# Patient Record
Sex: MALE | Race: BLACK OR AFRICAN AMERICAN
[De-identification: names, ages, dates, MRNs, and addresses within clinical notes are randomized per-mention and may not be internally consistent; named-entity substitution may affect disease eponyms.]

---

## 2020-10-27 ENCOUNTER — RESULT REVIEW (OUTPATIENT)
Age: 50
End: 2020-10-27

## 2022-04-11 PROBLEM — Z00.00 ENCOUNTER FOR PREVENTIVE HEALTH EXAMINATION: Status: ACTIVE | Noted: 2022-04-11

## 2022-05-18 ENCOUNTER — APPOINTMENT (OUTPATIENT)
Dept: ORTHOPEDIC SURGERY | Facility: CLINIC | Age: 52
End: 2022-05-18
Payer: OTHER MISCELLANEOUS

## 2022-05-18 VITALS — WEIGHT: 253 LBS | HEIGHT: 73 IN | BODY MASS INDEX: 33.53 KG/M2

## 2022-05-18 DIAGNOSIS — Z78.9 OTHER SPECIFIED HEALTH STATUS: ICD-10-CM

## 2022-05-18 DIAGNOSIS — I10 ESSENTIAL (PRIMARY) HYPERTENSION: ICD-10-CM

## 2022-05-18 DIAGNOSIS — E11.9 TYPE 2 DIABETES MELLITUS W/OUT COMPLICATIONS: ICD-10-CM

## 2022-05-18 PROCEDURE — 99072 ADDL SUPL MATRL&STAF TM PHE: CPT

## 2022-05-18 PROCEDURE — 73030 X-RAY EXAM OF SHOULDER: CPT | Mod: RT

## 2022-05-18 PROCEDURE — 72040 X-RAY EXAM NECK SPINE 2-3 VW: CPT

## 2022-05-18 PROCEDURE — 99204 OFFICE O/P NEW MOD 45 MIN: CPT

## 2022-05-18 NOTE — HISTORY OF PRESENT ILLNESS
[de-identified] :  53yo male presenting to the office c/o right shoulder pain from a work related injury that occurred on 5/9/22. 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  He reports injury occurred while lifting 5 gallon pale of spackle. Patient states he noticed slight lateral shoulder discomfort. Has noticed intermittent lateral radiating pain and difficulty into right 3-4th digits. He has inability to extend fingers.  Denies any numbness or tingling.

## 2022-05-18 NOTE — PHYSICAL EXAM
[de-identified] : Constitutional: The general appearance of the patient is well developed, well nourished, no deformities and well groomed. Normal \par \par Gait: Gait and function is as follows: normal gait. \par \par Skin: Head and neck visualized skin is normal. Left upper extremity visualized skin is normal. Right upper extremity visualized skin is normal. Thoracic Skin of the thoracic spine shows visualized skin is normal. \par \par Cardiovascular: palpable radial pulse bilaterally, good capillary refill in digits of the bilateral upper extremities and no temperature or color changes in the bilateral upper extremities. \par \par Lymphatic: Normal Palpation of lymph nodes in the cervical. \par \par Neurologic: fine motor control in the bilateral upper extremities is intact. Deep Tendon Reflexes in Upper and Lower Extremities Negative Doyle's in the bilateral upper extremities. The patient is oriented to time, place and person. Sensation to light touch intact in the bilateral upper extremities. Mood and Affect is normal. \par \par Right Shoulder: Inspection of the shoulder/upper arm is as follows: no scapula winging, no biceps deformity and no AC joint deformity. Inspection of the wound reveals healed incision. Palpation of the shoulder/upper arm is as follows: There is tenderness at the AC joint and proximal biceps tendon. Range of motion of the shoulder is as follows: Limited range of motion compatible with recent surgery. Strength of the shoulder is as follows: Supraspinatus 4/5. External Rotation 4/5. Internal Rotation 4/5. Infraspinatus 5/5 4/5. Deltoid 5/5 Ligament Stability and Special Tests of the shoulder is as follows: stable shoulder. \par \par \par \par Left Shoulder: Inspection of the shoulder/upper arm is as follows: There is a full, pain-free, stable range of motion of the shoulder with normal strength and no tenderness to palpation. \par \par Neck: \par Inspection / Palpation of the cervical spine is as follows: no paracervical tenderness. Range of motion of the cervical spine is as follows: moderately decreased range of motion of the cervical spine. Stability testing for the cervical spine is as follows Stable range of motion. \par \par \par Back, including spine: Inspection / Palpation of the thoracic/lumbar spine is as follows: There is a full, pain free, stable range of motion of the thoracic spine with a normal tone and not tenderness to palpation..\par

## 2022-05-18 NOTE — DISCUSSION/SUMMARY
[de-identified] : RUE: 4/5 triceps, 3+/5 WE, 3/5 Interosseus\par \par 51yo male presenting to the office c/o right shoulder pain from a work related injury that occurred on 5/9/22.\par x-rays today of c-spine demonstrate disk space narrowing at C5-6, C6-7\par x-rays today of right shoulder demonstrate no loss of surgical correction\par ROM of shoulder is preserved \par Recommended MRI of c-spine to further evaluate for nerve impingements \par Patient was prescribed an MDP as an anti-inflammatory \par He will consult with his PCP if he is able to take MDP due to diabetes \par Patient was prescribed a muscle relaxer to be taken before bed \par Follow up 1-2 weeks to review MRI \par consider follow up Dr. Merchant or Dr. Brown \par \par \par \par (1) We discussed a comprehensive treatment plans that included a prescription management plan involving the use of prescription strength medications to include Ibuprofen 600-800 mg TID, versus 500-650 mg Tylenol. We also discussed prescribing topical diclofenac (Voltaren gel) as well as once daily Meloxicam 15 mg.\par (2) The patient has More Than One chronic injuries/illnesses as outlined, discussed, and documented by ICD 10 codes listed, as well as the HPI and Plan section.\par There is a moderate risk of morbidity with further treatment, especially from use of prescription strength medications and possible side effects of these medications which include upset stomach and cardiac/renal issues with long term use were discussed.\par (3) I recommended that the patient follow-up with their medical physician to discuss any significant specific potential issues with long term use such as interactions with current medications or with exacerbation of underlying medical morbidities. \par \par Attestation:\par I, Elyssa Gilbert , attest that this documentation has been prepared under the direction and in the presence of Provider Arian Edgar MD.\par The documentation recorded by the scribe, in my presence, accurately reflects the service I personally performed, and the decisions made by me with my edits as appropriate.\par Arian Edgar MD\par \par

## 2022-05-20 ENCOUNTER — FORM ENCOUNTER (OUTPATIENT)
Age: 52
End: 2022-05-20

## 2022-05-21 ENCOUNTER — APPOINTMENT (OUTPATIENT)
Dept: MRI IMAGING | Facility: CLINIC | Age: 52
End: 2022-05-21
Payer: OTHER MISCELLANEOUS

## 2022-05-21 PROCEDURE — 72141 MRI NECK SPINE W/O DYE: CPT

## 2022-05-21 PROCEDURE — 99072 ADDL SUPL MATRL&STAF TM PHE: CPT

## 2022-05-25 ENCOUNTER — APPOINTMENT (OUTPATIENT)
Dept: ORTHOPEDIC SURGERY | Facility: CLINIC | Age: 52
End: 2022-05-25
Payer: OTHER MISCELLANEOUS

## 2022-05-25 VITALS — WEIGHT: 253 LBS | BODY MASS INDEX: 33.53 KG/M2 | HEIGHT: 73 IN

## 2022-05-25 DIAGNOSIS — M25.511 PAIN IN RIGHT SHOULDER: ICD-10-CM

## 2022-05-25 PROCEDURE — 99072 ADDL SUPL MATRL&STAF TM PHE: CPT

## 2022-05-25 PROCEDURE — 99214 OFFICE O/P EST MOD 30 MIN: CPT

## 2022-05-25 NOTE — PHYSICAL EXAM
[de-identified] : Constitutional: The general appearance of the patient is well developed, well nourished, no deformities and well groomed. Normal \par \par Gait: Gait and function is as follows: normal gait. \par \par Skin: Head and neck visualized skin is normal. Left upper extremity visualized skin is normal. Right upper extremity visualized skin is normal. Thoracic Skin of the thoracic spine shows visualized skin is normal. \par \par Cardiovascular: palpable radial pulse bilaterally, good capillary refill in digits of the bilateral upper extremities and no temperature or color changes in the bilateral upper extremities. \par \par Lymphatic: Normal Palpation of lymph nodes in the cervical. \par \par Neurologic: fine motor control in the bilateral upper extremities is intact. Deep Tendon Reflexes in Upper and Lower Extremities Negative Doyle's in the bilateral upper extremities. The patient is oriented to time, place and person. Sensation to light touch intact in the bilateral upper extremities. Mood and Affect is normal. \par \par Right Shoulder: Inspection of the shoulder/upper arm is as follows: no scapula winging, no biceps deformity and no AC joint deformity. Inspection of the wound reveals healed incision. Palpation of the shoulder/upper arm is as follows: There is tenderness at the AC joint and proximal biceps tendon. Range of motion of the shoulder is as follows: Limited range of motion compatible with recent surgery. Strength of the shoulder is as follows: Supraspinatus 4/5. External Rotation 4/5. Internal Rotation 4/5. Infraspinatus 5/5 4/5. Deltoid 5/5 Ligament Stability and Special Tests of the shoulder is as follows: stable shoulder. \par \par \par \par Left Shoulder: Inspection of the shoulder/upper arm is as follows: There is a full, pain-free, stable range of motion of the shoulder with normal strength and no tenderness to palpation. \par \par Neck: \par Inspection / Palpation of the cervical spine is as follows: no paracervical tenderness. Range of motion of the cervical spine is as follows: moderately decreased range of motion of the cervical spine. Stability testing for the cervical spine is as follows Stable range of motion. \par \par \par Back, including spine: Inspection / Palpation of the thoracic/lumbar spine is as follows: There is a full, pain free, stable range of motion of the thoracic spine with a normal tone and not tenderness to palpation..\par

## 2022-05-25 NOTE — HISTORY OF PRESENT ILLNESS
[de-identified] :  53yo male presenting to the office c/o right shoulder pain from a work related injury that occurred on 5/9/22. 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  He reports injury occurred while lifting 5 gallon pale of spackle. Patient states he noticed slight lateral shoulder discomfort. Has noticed intermittent lateral radiating pain and difficulty into right 3-4th digits. He has inability to extend fingers.  Denies any numbness or tingling. \par 5/25/22: Patient presents today for repeat evaluation of right shoulder and neck pain from work injury that occurred on 5/9/22. He sustained RUE pain while lifting spackle. He continues to note weakness in right hand. Patient returns to review MRI results.

## 2022-05-25 NOTE — DISCUSSION/SUMMARY
[de-identified] : RUE: 4/5 triceps, 3+/5 WE, 3/5 Interosseus\par \par 51yo male presenting to the office c/o right shoulder pain from a work related injury that occurred on 5/9/22.\par Today we personally reviewed MRI which demonstrates\par MRI of c-spine demonstrates Straightening of the cervical lordosis, multilevel degenerative disc disease and multiple disc herniations; Multilevel cord impingement and exiting nerve root impingement most severe right greater than left at C4-C5 and on the left at C5-C6 and C6-C7.\par \par \par Recommended follow up Dr. Merchant or Dr. Brown for further evaluation and intervention. \par Patient was provided PT RX to begin gentle ROM and massage. \par \par At this point i feel patient's symptoms are likely referred from cervical spine herniations. \par Discussed if he begins to notice worsening right shoulder weakness we could consider MRI of right shoulder to evaluate integrity of previous repair. \par \par \par \par (1) We discussed a comprehensive treatment plans that included a prescription management plan involving the use of prescription strength medications to include Ibuprofen 600-800 mg TID, versus 500-650 mg Tylenol. We also discussed prescribing topical diclofenac (Voltaren gel) as well as once daily Meloxicam 15 mg.\par (2) The patient has More Than One chronic injuries/illnesses as outlined, discussed, and documented by ICD 10 codes listed, as well as the HPI and Plan section.\par There is a moderate risk of morbidity with further treatment, especially from use of prescription strength medications and possible side effects of these medications which include upset stomach and cardiac/renal issues with long term use were discussed.\par (3) I recommended that the patient follow-up with their medical physician to discuss any significant specific potential issues with long term use such as interactions with current medications or with exacerbation of underlying medical morbidities. \par \par Attestation:\par I, Elyssa Gilbert , attest that this documentation has been prepared under the direction and in the presence of Provider Arian Edgar MD.\par The documentation recorded by the scribe, in my presence, accurately reflects the service I personally performed, and the decisions made by me with my edits as appropriate.\par Arian Edgar MD\par \par

## 2022-05-25 NOTE — DATA REVIEWED
[MRI] : MRI [Cervical Spine] : cervical spine [FreeTextEntry1] : 5/21/22 [facility]: 1. Straightening of the cervical lordosis, multilevel degenerative disc disease and multiple disc herniations.\par 2. Mild increased T2-weighted signal in the cord asymmetric left greater than right at the C4-C5 level \par potentially related to early gliosis or myelomalacia.\par 3. Multilevel cord impingement and exiting nerve root impingement most severe right greater than left at C4-C5 \par and on the left at C5-C6 and C6-C7.\par 4. No acute fracture.

## 2022-05-25 NOTE — WORK
[Partial] : partial [N/A] : : Not Applicable [Patient] : patient [No Rx restrictions] : No Rx restrictions. [I provided the services listed above] :  I provided the services listed above.

## 2022-06-03 ENCOUNTER — APPOINTMENT (OUTPATIENT)
Dept: ORTHOPEDIC SURGERY | Facility: CLINIC | Age: 52
End: 2022-06-03
Payer: OTHER MISCELLANEOUS

## 2022-06-03 VITALS — HEIGHT: 73 IN | WEIGHT: 253 LBS | BODY MASS INDEX: 33.53 KG/M2

## 2022-06-03 DIAGNOSIS — M54.12 RADICULOPATHY, CERVICAL REGION: ICD-10-CM

## 2022-06-03 PROCEDURE — 99204 OFFICE O/P NEW MOD 45 MIN: CPT

## 2022-06-03 PROCEDURE — 99072 ADDL SUPL MATRL&STAF TM PHE: CPT

## 2022-06-03 NOTE — HISTORY OF PRESENT ILLNESS
[Work related] : work related [Sudden] : sudden [1] : 2 [6] : 6 [Throbbing] : throbbing [Sleep] : sleep [Meds] : meds [Full time] : Work status: full time [de-identified] : 53 yo male presents with right sided neck pain. Pain started 5/9/22 after he lifted a heavy bucket of spackle, mostly using his right arm. At that time he felt a small "pull". As the day went on he started to feel pain. Patients complains of intermittent right sided neck pain that radiates down into right forearm. Pain described as throbbing, worse at night when lying down. Minimal symptoms throughout the day. Says he has difficulty extending his fingers on his right hand which is his main complaint. Denies paresthesias. Admits to difficulty gripping a cup with right hand due to weakness in 3rd, 4th fingers. Does get relief with flexeril, has not tried antiinflammatories.  [] : no [FreeTextEntry1] : C spine  [FreeTextEntry3] : 5/9/22  [FreeTextEntry5] : 53yo male presenting to the office c/o right shoulder pain from a work related injury that occurred on 5/9/22. 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 Patient states he noticed slight lateral shoulder discomfort. Has noticed intermittent lateral radiating pain and difficulty into right 3-4th digits. He has inability to extend fingers. Denies any numbness or tingling. \par  [FreeTextEntry7] : rt elbow  [de-identified] : 5/25/22 [de-identified] : Herve  [de-identified] : xray, mri

## 2022-06-03 NOTE — DATA REVIEWED
[MRI] : MRI [Cervical Spine] : cervical spine [I independently reviewed and interpreted images and report] : I independently reviewed and interpreted images and report [FreeTextEntry1] : Cervical spine MRI (05/21/22) - [facility]\par On my interpretation of the images\par C2-3 normal \par C3-4 moderate bilateral foraminal stenosis \par C4-5 severe right and moderate to severe left foraminal stenosis, moderate DDD, at the C5 vertebral body level there is signal intensity\par C5-6 broad based disc herniation, with a left sided larger component causing spinal cord compression, severe right sided foraminal stenosis \par C6-7 moderate bilateral foraminal stenosis, moderate disc degeneration\par C7-T1 normal

## 2022-06-03 NOTE — ASSESSMENT
[FreeTextEntry1] : 51 y/o male with cervical spinal stenosis. Patient was provided with a referral for cervical physical therapy to work on stretching, strengthening and range of motion. Patient was provided with a cervical home exercise program. I would like to follow up with the patient in 6 weeks to assess his response to conservative care.\par \par Prior to appointment and during encounter with patient extensive medical records were reviewed including but not limited to, hospital records, out patient records, imaging results, and lab data. During this appointment the patient was examined, diagnoses were discussed and explained in a face to face manner. In addition extensive time was spent reviewing aforementioned diagnostic studies. Counseling including abnormal image results, differential diagnoses, treatment options, risk and benefits, lifestyle changes, current condition, and current medications was performed. Patient's comments, questions, and concerns were address and patient verbalized understanding. Based on this patient's presentation at our office, which is an orthopedic spine surgeon's office, this patient inherently / intrinsically has a risk, however minute, of developing issues such as Cauda equina syndrome, bowel and bladder changes, or progression of motor or neurological deficits such as paralysis which may be permanent.\par \par I, Ancelmo Lai, attest that this documentation has been prepared under the direction and in the presence of provider Efrem Merchant MD.

## 2022-06-03 NOTE — PHYSICAL EXAM
[NL (45)] : right lateral flexion 45 degrees [NL (80)] : right lateral rotation 80 degrees [] : motor exam is non-focal throughout both upper extremities [5___] : right finger abductors 5[unfilled]/5 [4___] : right grasp 4[unfilled]/5 [de-identified] : Constitutional:\par -  General Appearance: \par Unremarkable\par Body Habitus\par Well Developed \par Well Nourished\par Body Habitus\par No Deformities\par Well Groomed\par \par Ability To communicate:\par Normal\par \par Neurologic: \par Global sensation is intact to upper and lower extremities.  See examination of Neck and/or Spine for exceptions.\par Orientation to Time, Place and Person is: Normal\par Mood And Affect is Normal\par \par Skin:\par -  Head/Face, Right Upper/Lower Extremity, Left Upper/Lower Extremity: Normal\par See Examination of Neck and/or Spine for exceptions\par \par Cardiovascular:\par Peripheral Cardiovascular System is Normal\par \par Palpation of Lymph Nodes:\par Normal Palpation of lymph nodes in: Axilla, Cervical, Inguinal\par Abnormal Palpation of lymph nodes in: None

## 2022-06-17 ENCOUNTER — TRANSCRIPTION ENCOUNTER (OUTPATIENT)
Age: 52
End: 2022-06-17

## 2022-07-15 ENCOUNTER — APPOINTMENT (OUTPATIENT)
Dept: ORTHOPEDIC SURGERY | Facility: CLINIC | Age: 52
End: 2022-07-15

## 2022-07-15 VITALS — BODY MASS INDEX: 33.53 KG/M2 | WEIGHT: 253 LBS | HEIGHT: 73 IN

## 2022-07-15 DIAGNOSIS — M50.20 OTHER CERVICAL DISC DISPLACEMENT, UNSPECIFIED CERVICAL REGION: ICD-10-CM

## 2022-07-15 PROCEDURE — 99213 OFFICE O/P EST LOW 20 MIN: CPT

## 2022-07-15 PROCEDURE — 99072 ADDL SUPL MATRL&STAF TM PHE: CPT

## 2022-07-27 NOTE — PHYSICAL EXAM
[NL (45)] : right lateral flexion 45 degrees [NL (80)] : right lateral rotation 80 degrees [5___] : right grasp 5[unfilled]/5 [Biceps 2+] : biceps 2+ [Triceps 2+] : triceps 2+ [Brachioradialis 2+] : brachioradialis 2+ [de-identified] : Constitutional:\par -  General Appearance: \par Unremarkable\par Body Habitus\par Well Developed \par Well Nourished\par Body Habitus\par No Deformities\par Well Groomed\par \par Ability To communicate:\par Normal\par \par Neurologic: \par Global sensation is intact to upper and lower extremities.  See examination of Neck and/or Spine for exceptions.\par Orientation to Time, Place and Person is: Normal\par Mood And Affect is Normal\par \par Skin:\par -  Head/Face, Right Upper/Lower Extremity, Left Upper/Lower Extremity: Normal\par See Examination of Neck and/or Spine for exceptions\par \par Cardiovascular:\par Peripheral Cardiovascular System is Normal\par \par Palpation of Lymph Nodes:\par Normal Palpation of lymph nodes in: Axilla, Cervical, Inguinal\par Abnormal Palpation of lymph nodes in: None  [] : no rhomboid tenderness

## 2022-07-27 NOTE — ASSESSMENT
[FreeTextEntry1] : 53 y/o male with cervical stenosis, radiculopathy (resolved). As the patients symptom are completely resolved, I can see him back on an as needed basis.

## 2022-07-27 NOTE — HISTORY OF PRESENT ILLNESS
[Work related] : work related [5] : 5 [3] : 3 [Full time] : Work status: full time [] : yes [de-identified] : 51 y/o male presents for a follow up evaluation of cervical spinal stenosis. Patient reports a complete resolution of his symptoms. Patient was denied physical therapy. \par \par Injury Details: Pain started 5/9/22 after he lifted a heavy bucket of spackle, mostly using his right arm. At that time he felt a small "pull". As the day went on he started to feel pain. 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

## 2024-04-10 ENCOUNTER — APPOINTMENT (OUTPATIENT)
Dept: GASTROENTEROLOGY | Facility: CLINIC | Age: 54
End: 2024-04-10
Payer: COMMERCIAL

## 2024-04-10 VITALS
DIASTOLIC BLOOD PRESSURE: 80 MMHG | SYSTOLIC BLOOD PRESSURE: 142 MMHG | BODY MASS INDEX: 33.8 KG/M2 | HEIGHT: 73 IN | WEIGHT: 255 LBS

## 2024-04-10 DIAGNOSIS — K31.89 OTHER DISEASES OF STOMACH AND DUODENUM: ICD-10-CM

## 2024-04-10 PROCEDURE — 99204 OFFICE O/P NEW MOD 45 MIN: CPT

## 2024-04-10 RX ORDER — METFORMIN HYDROCHLORIDE 1000 MG/1
1000 TABLET, COATED ORAL
Refills: 0 | Status: ACTIVE | COMMUNITY

## 2024-04-10 RX ORDER — METHYLPREDNISOLONE 4 MG/1
4 TABLET ORAL
Qty: 1 | Refills: 0 | Status: DISCONTINUED | COMMUNITY
Start: 2022-05-18 | End: 2024-04-10

## 2024-04-10 RX ORDER — AMLODIPINE BESYLATE 5 MG/1
5 TABLET ORAL
Refills: 0 | Status: ACTIVE | COMMUNITY

## 2024-04-10 RX ORDER — METOPROLOL SUCCINATE 50 MG/1
50 TABLET, EXTENDED RELEASE ORAL
Refills: 0 | Status: ACTIVE | COMMUNITY

## 2024-04-10 RX ORDER — PNV NO.95/FERROUS FUM/FOLIC AC 28MG-0.8MG
TABLET ORAL
Refills: 0 | Status: ACTIVE | COMMUNITY

## 2024-04-10 RX ORDER — ROSUVASTATIN CALCIUM 10 MG/1
10 TABLET, FILM COATED ORAL
Refills: 0 | Status: ACTIVE | COMMUNITY

## 2024-04-10 RX ORDER — CYCLOBENZAPRINE HYDROCHLORIDE 10 MG/1
10 TABLET, FILM COATED ORAL
Qty: 20 | Refills: 0 | Status: DISCONTINUED | COMMUNITY
Start: 2022-05-18 | End: 2024-04-10

## 2024-04-16 NOTE — PHYSICAL EXAM
[Alert] : alert [Healthy Appearing] : healthy appearing [Sclera] : the sclera and conjunctiva were normal [Hearing Threshold Finger Rub Not [place]] : hearing was normal [Normal Appearance] : the appearance of the neck was normal [No Respiratory Distress] : no respiratory distress [Auscultation Breath Sounds / Voice Sounds] : lungs were clear to auscultation bilaterally [Heart Rate And Rhythm] : heart rate was normal and rhythm regular [Bowel Sounds] : normal bowel sounds [Abdomen Tenderness] : non-tender [Abdomen Soft] : soft [Abnormal Walk] : normal gait [Normal Color / Pigmentation] : normal skin color and pigmentation [Oriented To Time, Place, And Person] : oriented to person, place, and time [No Clubbing, Cyanosis] : no clubbing or cyanosis of the fingernails [] : no rash [Cranial Nerves Intact] : cranial nerves 2-12 were intact [No Focal Deficits] : no focal deficits

## 2024-04-16 NOTE — HISTORY OF PRESENT ILLNESS
[FreeTextEntry1] : Tanvir Stokes is a 54 year old male referred by Dr. Anderson for evaluation of gastric nodule when undergoing endoscopy, colonoscopy for anemia.   Patient recently. had an endoscopy/colonoscopy for anemia. On the upper endoscopy, a gastric nodule was observed, biopsies by Dr. Anderson were WNL. Patient denies any GI complaints. No overt signs of GI bleeding like hematemesis, melena, hematochezia, or coffee grind emesis.  No post prandial symptoms. No nausea or vomiting. No abdominal pain. Good appetite. No weight loss. No jaundice.

## 2024-04-16 NOTE — ASSESSMENT
[FreeTextEntry1] : Plan: Discussed need for EUS to further examine and possibly rebiopsy nodule observed although likely benign. Risks versus benefits as well as instructions reviewed, pt agrees to planned procedure. All questions answered. Seen and discussed with Dr. Silva.

## 2024-04-26 ENCOUNTER — OUTPATIENT (OUTPATIENT)
Dept: OUTPATIENT SERVICES | Facility: HOSPITAL | Age: 54
LOS: 1 days | End: 2024-04-26
Payer: COMMERCIAL

## 2024-04-26 VITALS
OXYGEN SATURATION: 100 % | TEMPERATURE: 98 F | WEIGHT: 254.63 LBS | HEART RATE: 61 BPM | SYSTOLIC BLOOD PRESSURE: 150 MMHG | DIASTOLIC BLOOD PRESSURE: 76 MMHG | HEIGHT: 72 IN | RESPIRATION RATE: 16 BRPM

## 2024-04-26 DIAGNOSIS — Z98.890 OTHER SPECIFIED POSTPROCEDURAL STATES: Chronic | ICD-10-CM

## 2024-04-26 DIAGNOSIS — K31.89 OTHER DISEASES OF STOMACH AND DUODENUM: ICD-10-CM

## 2024-04-26 LAB
A1C WITH ESTIMATED AVERAGE GLUCOSE RESULT: 6.6 % — HIGH (ref 4–5.6)
ANION GAP SERPL CALC-SCNC: 3 MMOL/L — LOW (ref 5–17)
APTT BLD: 29.3 SEC — SIGNIFICANT CHANGE UP (ref 24.5–35.6)
BASOPHILS # BLD AUTO: 0 K/UL — SIGNIFICANT CHANGE UP (ref 0–0.2)
BASOPHILS NFR BLD AUTO: 0 % — SIGNIFICANT CHANGE UP (ref 0–2)
BUN SERPL-MCNC: 10 MG/DL — SIGNIFICANT CHANGE UP (ref 7–23)
CALCIUM SERPL-MCNC: 9.1 MG/DL — SIGNIFICANT CHANGE UP (ref 8.5–10.1)
CHLORIDE SERPL-SCNC: 109 MMOL/L — HIGH (ref 96–108)
CO2 SERPL-SCNC: 26 MMOL/L — SIGNIFICANT CHANGE UP (ref 22–31)
CREAT SERPL-MCNC: 1.04 MG/DL — SIGNIFICANT CHANGE UP (ref 0.5–1.3)
EGFR: 85 ML/MIN/1.73M2 — SIGNIFICANT CHANGE UP
EOSINOPHIL # BLD AUTO: 0.31 K/UL — SIGNIFICANT CHANGE UP (ref 0–0.5)
EOSINOPHIL NFR BLD AUTO: 6 % — SIGNIFICANT CHANGE UP (ref 0–6)
ESTIMATED AVERAGE GLUCOSE: 143 MG/DL — HIGH (ref 68–114)
GLUCOSE SERPL-MCNC: 91 MG/DL — SIGNIFICANT CHANGE UP (ref 70–99)
HCT VFR BLD CALC: 41.2 % — SIGNIFICANT CHANGE UP (ref 39–50)
HGB BLD-MCNC: 13 G/DL — SIGNIFICANT CHANGE UP (ref 13–17)
INR BLD: 0.98 RATIO — SIGNIFICANT CHANGE UP (ref 0.85–1.18)
LYMPHOCYTES # BLD AUTO: 1.99 K/UL — SIGNIFICANT CHANGE UP (ref 1–3.3)
LYMPHOCYTES # BLD AUTO: 39 % — SIGNIFICANT CHANGE UP (ref 13–44)
MANUAL SMEAR VERIFICATION: SIGNIFICANT CHANGE UP
MCHC RBC-ENTMCNC: 21.9 PG — LOW (ref 27–34)
MCHC RBC-ENTMCNC: 31.6 GM/DL — LOW (ref 32–36)
MCV RBC AUTO: 69.5 FL — LOW (ref 80–100)
MICROCYTES BLD QL: SLIGHT — SIGNIFICANT CHANGE UP
MONOCYTES # BLD AUTO: 0.36 K/UL — SIGNIFICANT CHANGE UP (ref 0–0.9)
MONOCYTES NFR BLD AUTO: 7 % — SIGNIFICANT CHANGE UP (ref 2–14)
NEUTROPHILS # BLD AUTO: 2.45 K/UL — SIGNIFICANT CHANGE UP (ref 1.8–7.4)
NEUTROPHILS NFR BLD AUTO: 48 % — SIGNIFICANT CHANGE UP (ref 43–77)
NRBC # BLD: 0 /100 WBCS — SIGNIFICANT CHANGE UP (ref 0–0)
NRBC # BLD: SIGNIFICANT CHANGE UP /100 WBCS (ref 0–0)
PLAT MORPH BLD: NORMAL — SIGNIFICANT CHANGE UP
PLATELET # BLD AUTO: 236 K/UL — SIGNIFICANT CHANGE UP (ref 150–400)
POTASSIUM SERPL-MCNC: 4.2 MMOL/L — SIGNIFICANT CHANGE UP (ref 3.5–5.3)
POTASSIUM SERPL-SCNC: 4.2 MMOL/L — SIGNIFICANT CHANGE UP (ref 3.5–5.3)
PROTHROM AB SERPL-ACNC: 11.1 SEC — SIGNIFICANT CHANGE UP (ref 9.5–13)
RBC # BLD: 5.93 M/UL — HIGH (ref 4.2–5.8)
RBC # FLD: 15.4 % — HIGH (ref 10.3–14.5)
RBC BLD AUTO: ABNORMAL
SODIUM SERPL-SCNC: 138 MMOL/L — SIGNIFICANT CHANGE UP (ref 135–145)
WBC # BLD: 5.11 K/UL — SIGNIFICANT CHANGE UP (ref 3.8–10.5)
WBC # FLD AUTO: 5.11 K/UL — SIGNIFICANT CHANGE UP (ref 3.8–10.5)

## 2024-04-26 PROCEDURE — 93005 ELECTROCARDIOGRAM TRACING: CPT

## 2024-04-26 PROCEDURE — 85025 COMPLETE CBC W/AUTO DIFF WBC: CPT

## 2024-04-26 PROCEDURE — 85610 PROTHROMBIN TIME: CPT

## 2024-04-26 PROCEDURE — 80048 BASIC METABOLIC PNL TOTAL CA: CPT

## 2024-04-26 PROCEDURE — 85730 THROMBOPLASTIN TIME PARTIAL: CPT

## 2024-04-26 PROCEDURE — 83036 HEMOGLOBIN GLYCOSYLATED A1C: CPT

## 2024-04-26 PROCEDURE — 36415 COLL VENOUS BLD VENIPUNCTURE: CPT

## 2024-04-26 PROCEDURE — 93010 ELECTROCARDIOGRAM REPORT: CPT

## 2024-04-26 PROCEDURE — 99214 OFFICE O/P EST MOD 30 MIN: CPT | Mod: 25

## 2024-04-26 NOTE — H&P PST ADULT - NSICDXPASTMEDICALHX_GEN_ALL_CORE_FT
PAST MEDICAL HISTORY:  Gastric nodule     HLD (hyperlipidemia)     HTN (hypertension)     Smoker     Type 2 diabetes mellitus

## 2024-04-26 NOTE — H&P PST ADULT - NSICDXPASTSURGICALHX_GEN_ALL_CORE_FT
PAST SURGICAL HISTORY:  H/O arthroscopy of shoulder     H/O colonoscopy     History of ankle surgery

## 2024-04-26 NOTE — H&P PST ADULT - ASSESSMENT
53 y/o male 1/2 ppd smoker, DM type 2, HTn and HLD presents to [facility], [place] for scheduled endoscopy on 5/9/24.

## 2024-04-26 NOTE — H&P PST ADULT - HISTORY OF PRESENT ILLNESS
55 y/o male 1/2 ppd smoker, DM type 2, HTn and HLD presents to [facility] for scheduled endoscopy on 5/9/24. Patient reports during routine colonoscopy " fatty tissue noted in the abdominal wall" and was referred to surgeon.

## 2024-04-26 NOTE — H&P PST ADULT - NSICDXPROCEDURE_GEN_ALL_CORE_FT
History  Chief Complaint   Patient presents with    Shortness of Breath     Tested + for covid on Sunday, has been w/ a cough and SOB since then  This is a 28-year-old female patient who was having COVID like symptoms for 1 week did have a COVID test she states that the kidney and collagen was called 5 days ago stating she was positive  Since then she states that she has become short of breath with without exertion more so goes into coughing fits with makes her short of breath denies any chest pain  Denies any fever chills  Does have body aches  No blurred vision no double vision no headache  Does have a productive cough no sore throat or congestion  No nausea vomiting diarrhea abdominal pain  No urinary symptoms  No pleuritic chest pain or cardiac chest pain  Shortness of breath mostly with coughing  She has not had her COVID vaccine  Patient's walking pulse ox is 92-93% on room air    Portions of the record may have been created with voice recognition software  Occasional wrong word or "sound a like" substitutions may have occurred due to the inherent limitations of voice recognition software  Read the chart carefully and recognize, using context, where substitutions have occurred                 None       History reviewed  No pertinent past medical history  Past Surgical History:   Procedure Laterality Date    CHOLECYSTECTOMY         History reviewed  No pertinent family history  I have reviewed and agree with the history as documented  E-Cigarette/Vaping     E-Cigarette/Vaping Substances     Social History     Tobacco Use    Smoking status: Not on file   Substance Use Topics    Alcohol use: Not on file    Drug use: Not on file       Review of Systems   Constitutional: Positive for chills  Negative for fatigue and fever  HENT: Negative for congestion and hearing loss  Eyes: Negative for photophobia and pain  Respiratory: Positive for cough and shortness of breath   Negative for apnea, choking, chest tightness, wheezing and stridor  Cardiovascular: Negative for chest pain, palpitations and leg swelling  Gastrointestinal: Negative for abdominal pain, diarrhea and nausea  Endocrine: Negative for polydipsia and polyphagia  Genitourinary: Negative for dysuria and frequency  Musculoskeletal: Positive for arthralgias and myalgias  Negative for back pain, gait problem, joint swelling, neck pain and neck stiffness  Skin: Negative for pallor and rash  Allergic/Immunologic: Negative for environmental allergies and food allergies  Neurological: Negative for dizziness and headaches  Psychiatric/Behavioral: Negative for agitation and confusion  Physical Exam  Physical Exam  Vitals and nursing note reviewed  Constitutional:       General: She is not in acute distress  Appearance: She is well-developed  She is not ill-appearing, toxic-appearing or diaphoretic  HENT:      Head: Normocephalic and atraumatic  Right Ear: Tympanic membrane, ear canal and external ear normal       Left Ear: Tympanic membrane, ear canal and external ear normal       Nose: No congestion or rhinorrhea  Mouth/Throat:      Mouth: Mucous membranes are moist       Pharynx: Oropharynx is clear  No oropharyngeal exudate or posterior oropharyngeal erythema  Eyes:      Conjunctiva/sclera: Conjunctivae normal       Pupils: Pupils are equal, round, and reactive to light  Cardiovascular:      Rate and Rhythm: Regular rhythm  Tachycardia present  Pulmonary:      Effort: Pulmonary effort is normal       Breath sounds: Normal breath sounds  Abdominal:      General: Bowel sounds are normal       Palpations: Abdomen is soft  Tenderness: There is no abdominal tenderness  Musculoskeletal:         General: Normal range of motion  Cervical back: Normal range of motion and neck supple  Right lower leg: No edema  Left lower leg: No edema  Skin:     General: Skin is warm  Capillary Refill: Capillary refill takes less than 2 seconds  Neurological:      General: No focal deficit present  Mental Status: She is alert and oriented to person, place, and time  Mental status is at baseline  Psychiatric:         Mood and Affect: Mood normal          Behavior: Behavior normal          Vital Signs  ED Triage Vitals   Temperature Pulse Respirations Blood Pressure SpO2   08/18/21 1502 08/18/21 1502 08/18/21 1502 08/18/21 1502 08/18/21 1502   99 4 °F (37 4 °C) (!) 106 18 127/51 95 %      Temp Source Heart Rate Source Patient Position - Orthostatic VS BP Location FiO2 (%)   08/18/21 1502 08/18/21 1502 08/18/21 1502 08/18/21 1502 --   Tympanic Monitor Sitting Left arm       Pain Score       08/18/21 1856       9           Vitals:    08/18/21 1502 08/18/21 1803 08/18/21 2116   BP: 127/51 140/78 124/65   Pulse: (!) 106 93 84   Patient Position - Orthostatic VS: Sitting Sitting Lying         Visual Acuity      ED Medications  Medications   acetaminophen (TYLENOL) tablet 650 mg (650 mg Oral Given 8/18/21 1856)   iohexol (OMNIPAQUE) 350 MG/ML injection (SINGLE-DOSE) 85 mL (85 mL Intravenous Given 8/18/21 1755)   azithromycin (ZITHROMAX) tablet 500 mg (500 mg Oral Given 8/18/21 2115)   promethazine (PHENERGAN) tablet 25 mg (25 mg Oral Given 8/18/21 2116)       Diagnostic Studies  Results Reviewed     Procedure Component Value Units Date/Time    Blood culture #1 [[id number]] Collected: 08/18/21 1608    Lab Status: Preliminary result Specimen: Blood from Arm, Left Updated: 08/18/21 2201     Blood Culture Received in Microbiology Lab  Culture in Progress  Blood culture #2 [[id number]] Collected: 08/18/21 1550    Lab Status: Preliminary result Specimen: Blood from Arm, Right Updated: 08/18/21 2201     Blood Culture Received in Microbiology Lab  Culture in Progress      Procalcitonin with AM Reflex [[id number]]  (Normal) Collected: 08/18/21 1606    Lab Status: Final result Specimen: Blood from Arm, Right Updated: 08/18/21 2045     Procalcitonin 0 13 ng/ml     Ferritin [[id number]]  (Abnormal) Collected: 08/18/21 1606    Lab Status: Final result Specimen: Blood from Arm, Right Updated: 08/18/21 1945     Ferritin 1,132 ng/mL     Calcium, ionized [[id number]]  (Abnormal) Collected: 08/18/21 1606    Lab Status: Final result Specimen: Blood from Arm, Right Updated: 08/18/21 1927     Calcium, Ionized 1 08 mmol/L     Novel Coronavirus (Covid-19),PCR [facility] - 2 hour stat [[id number]]  (Abnormal) Collected: 08/18/21 1603    Lab Status: Final result Specimen: Nares from Nose Updated: 08/18/21 1738     SARS-CoV-2 Positive    Narrative: The specimen collection materials, transport medium, and/or testing methodology utilized in the production of these test results have been proven to be reliable in a limited validation with an abbreviated program under the Emergency Utilization Authorization provided by the FDA  Testing reported as "Presumptive positive" will be confirmed with secondary testing to ensure result accuracy  Clinical caution and judgement should be used with the interpretation of these results with consideration of the clinical impression and other laboratory testing  Testing reported as "Positive" or "Negative" has been proven to be accurate according to standard laboratory validation requirements  All testing is performed with control materials showing appropriate reactivity at standard intervals        Troponin I [[id number]]  (Normal) Collected: 08/18/21 1606    Lab Status: Final result Specimen: Blood from Arm, Right Updated: 08/18/21 1713     Troponin I <0 01 ng/mL     NT-BNP PRO [[id number]]  (Normal) Collected: 08/18/21 1606    Lab Status: Final result Specimen: Blood from Arm, Right Updated: 08/18/21 1710     NT-proBNP 169 pg/mL     Comprehensive metabolic panel [[id number]]  (Abnormal) Collected: 08/18/21 1606    Lab Status: Final result Specimen: Blood from Arm, Right Updated: 08/18/21 1705 Sodium 138 mmol/L      Potassium 3 6 mmol/L      Chloride 101 mmol/L      CO2 27 mmol/L      ANION GAP 10 mmol/L      BUN 8 mg/dL      Creatinine 0 61 mg/dL      Glucose 119 mg/dL      Calcium 8 8 mg/dL       U/L      ALT 65 U/L      Alkaline Phosphatase 100 U/L      Total Protein 7 6 g/dL      Albumin 4 0 g/dL      Total Bilirubin 0 42 mg/dL      eGFR 104 ml/min/1 73sq m     Narrative:      [place] guidelines for Chronic Kidney Disease (CKD):     Stage 1 with normal or high GFR (GFR > 90 mL/min/1 73 square meters)    Stage 2 Mild CKD (GFR = 60-89 mL/min/1 73 square meters)    Stage 3A Moderate CKD (GFR = 45-59 mL/min/1 73 square meters)    Stage 3B Moderate CKD (GFR = 30-44 mL/min/1 73 square meters)    Stage 4 Severe CKD (GFR = 15-29 mL/min/1 73 square meters)    Stage 5 End Stage CKD (GFR <15 mL/min/1 73 square meters)  Note: GFR calculation is accurate only with a steady state creatinine    Triglycerides [[id number]]  (Normal) Collected: 08/18/21 1606    Lab Status: Final result Specimen: Blood from Arm, Right Updated: 08/18/21 1704     Triglycerides 132 mg/dL     Magnesium [[id number]]  (Normal) Collected: 08/18/21 1606    Lab Status: Final result Specimen: Blood from Arm, Right Updated: 08/18/21 1704     Magnesium 1 7 mg/dL     C-reactive protein [[id number]]  (Abnormal) Collected: 08/18/21 1606    Lab Status: Final result Specimen: Blood from Arm, Right Updated: 08/18/21 1704     CRP 72 3 mg/L     CK (with reflex to MB) [[id number]]  (Normal) Collected: 08/18/21 1606    Lab Status: Final result Specimen: Blood from Arm, Right Updated: 08/18/21 1704     Total  U/L     D-dimer, quantitative [[id number]]  (Abnormal) Collected: 08/18/21 1606    Lab Status: Final result Specimen: Blood from Arm, Right Updated: 08/18/21 1703     D-Dimer, Quant 0 85 ug/ml FEU     Lactic acid, plasma [[id number]]  (Normal) Collected: 08/18/21 1603    Lab Status: Final result Specimen: Blood from Arm, Right Updated: 08/18/21 1701     LACTIC ACID 0 9 mmol/L     Narrative:      Result may be elevated if tourniquet was used during collection  Protime-INR [[id number]]  (Normal) Collected: 08/18/21 1606    Lab Status: Final result Specimen: Blood from Arm, Right Updated: 08/18/21 1658     Protime 12 9 seconds      INR 0 96    CBC and differential [[id number]]  (Abnormal) Collected: 08/18/21 1606    Lab Status: Final result Specimen: Blood from Arm, Right Updated: 08/18/21 1645     WBC 2 90 Thousand/uL      RBC 4 65 Million/uL      Hemoglobin 13 2 g/dL      Hematocrit 39 5 %      MCV 85 fL      MCH 28 3 pg      MCHC 33 3 g/dL      RDW 15 0 %      MPV 9 9 fL      Platelets 401 Thousands/uL      Neutrophils Relative 68 %      Lymphocytes Relative 25 %      Monocytes Relative 7 %      Eosinophils Relative 0 %      Basophils Relative 0 %      Neutrophils Absolute 2 00 Thousands/µL      Lymphocytes Absolute 0 70 Thousands/µL      Monocytes Absolute 0 20 Thousand/µL      Eosinophils Absolute 0 00 Thousand/µL      Basophils Absolute 0 00 Thousands/µL     Glucose 6 phosphate dehydrogenase [[id number]] Collected: 08/18/21 1606    Lab Status: In process Specimen: Blood from Arm, Right Updated: 08/18/21 1630                 CTA ED chest PE study   Final Result by Daxa Arce DO (08/18 1821)      No pulmonary arterial filling defect identified from the pulmonary trunk to the proximal segmental branches  More peripheral branches are poorly assessed secondary to artifact      Pulmonary findings of known COVID 19 pneumonia  Additional findings as above  Workstation performed: [id number]         XR chest 1 view portable   ED Interpretation by Ludwin Nunes PA-C (08/18 1543)   Bilateral consolidations consistent with COVID pneumonia      Final Result by Brendan Valentine MD (08/18 1618)      Patchy bilateral airspace opacities, compatible with reported history of Covid-19 pneumonia  Workstation performed: [id number]                    Procedures  Procedures         ED Course  ED Course as of Aug 19 0758   Wed Aug 18, 2021   1544 Initial EKG interpreted by me 92 beats per minute normal sinus rhythm no ST elevation normal axis  no previous      1759 Signed out to Faisal Loomis PA-C                                SBIRT 22yo+      Most Recent Value   SBIRT (23 yo +)   In order to provide better care to our patients, we are screening all of our patients for alcohol and drug use  Would it be okay to ask you these screening questions? Yes Filed at: 08/18/2021 1636   Initial Alcohol Screen: US AUDIT-C    1  How often do you have a drink containing alcohol?  0 Filed at: 08/18/2021 1636   2  How many drinks containing alcohol do you have on a typical day you are drinking? 0 Filed at: 08/18/2021 1636   3a  Male UNDER 65: How often do you have five or more drinks on one occasion? 0 Filed at: 08/18/2021 1636   3b  FEMALE Any Age, or MALE 65+: How often do you have 4 or more drinks on one occassion? 0 Filed at: 08/18/2021 1636   Audit-C Score  0 Filed at: 08/18/2021 1636   ZAHIRA: How many times in the past year have you    Used an illegal drug or used a prescription medication for non-medical reasons?   Never Filed at: 08/18/2021 1636          Wells' Criteria for PE      Most Recent Value   Wells' Criteria for PE   Clinical signs and symptoms of DVT  0 Filed at: 08/18/2021 1742   PE is primary diagnosis or equally likely  3 Filed at: 08/18/2021 1742   HR >100  1 5 Filed at: 08/18/2021 1742   Immobilization at least 3 days or Surgery in the previous 4 weeks  0 Filed at: 08/18/2021 1742   Previous, objectively diagnosed PE or DVT  0 Filed at: 08/18/2021 1742   Hemoptysis  0 Filed at: 08/18/2021 1742   Malignancy with treatment within 6 months or palliative  0 Filed at: 08/18/2021 1742   Wells' Criteria Total  4 5 Filed at: 08/18/2021 1742                MDM    Disposition  Final diagnoses: Pneumonia due to COVID-19 virus     Time reflects when diagnosis was documented in both MDM as applicable and the Disposition within this note     Time User Action Codes Description Comment    8/18/2021  8:47 PM Lindsay Zacarias Add [U07 1,  J12 82] Pneumonia due to COVID-19 virus       ED Disposition     ED Disposition Condition Date/Time Comment    Discharge Stable Wed Aug 18, 2021  8:48 PM Berkley Dobbins discharge to home/self care              Follow-up Information     Follow up With Specialties Details Why Contact Info Additional [address] In 3 days  [address], [address] [address]  [address], [address], [address] [place], [place], [address] [address] Heart Emergency Department Emergency Medicine In 1 day If symptoms worsen [address] [address]  [facility] Heart Emergency Department          Discharge Medication List as of 8/18/2021  8:58 PM      START taking these medications    Details   Ascorbic Acid (vitamin C) 1000 MG tablet Take 1 tablet (1,000 mg total) by mouth 2 (two) times a day for 10 days, Starting Wed 8/18/2021, Until Sat 8/28/2021, Normal      azithromycin (ZITHROMAX) 250 mg tablet Take 1 tablet (250 mg total) by mouth every 24 hours for 4 days, Starting Thu 8/19/2021, Until Mon 8/23/2021, Normal      cholecalciferol (VITAMIN D3) 1,000 units tablet Take 2 tablets (2,000 Units total) by mouth daily for 10 days, Starting Wed 8/18/2021, Until Sat 8/28/2021, Normal      Multiple Vitamin (multivitamin) tablet Take 1 tablet by mouth daily for 10 days, Starting Wed 8/18/2021, Until Sat 8/28/2021, Normal      promethazine (PHENERGAN) 25 mg tablet Take 1 tablet (25 mg total) by mouth every 6 (six) hours as needed (cough) for up to 4 days, Starting Wed 8/18/2021, Until Sun 8/22/2021 at 2359, Normal      zinc sulfate (ZINCATE) 220 mg capsule Take 1 capsule (220 mg total) by mouth daily for 10 days, Starting Wed 8/18/2021, Until Sat 8/28/2021, Normal               PDMP Review     None          ED Provider  Electronically Signed by           Savita Monroe PA-C  [facility]21 [facility]  08/19/21 4994 PROCEDURES:  Endoscopy, UGI 26-Apr-2024 14:05:42  Daxa Fraire

## 2024-04-26 NOTE — H&P PST ADULT - PSYCHIATRIC
negative Xelcasimiroz Pregnancy And Lactation Text: This medication is Pregnancy Category D and is not considered safe during pregnancy.  The risk during breast feeding is also uncertain.

## 2024-04-27 DIAGNOSIS — K31.89 OTHER DISEASES OF STOMACH AND DUODENUM: ICD-10-CM

## 2024-05-09 ENCOUNTER — APPOINTMENT (OUTPATIENT)
Dept: GASTROENTEROLOGY | Facility: HOSPITAL | Age: 54
End: 2024-05-09

## 2024-05-09 ENCOUNTER — OUTPATIENT (OUTPATIENT)
Dept: INPATIENT UNIT | Facility: HOSPITAL | Age: 54
LOS: 1 days | Discharge: HOME | End: 2024-05-09
Payer: COMMERCIAL

## 2024-05-09 ENCOUNTER — TRANSCRIPTION ENCOUNTER (OUTPATIENT)
Age: 54
End: 2024-05-09

## 2024-05-09 VITALS
SYSTOLIC BLOOD PRESSURE: 137 MMHG | OXYGEN SATURATION: 100 % | HEART RATE: 55 BPM | DIASTOLIC BLOOD PRESSURE: 79 MMHG | RESPIRATION RATE: 18 BRPM | TEMPERATURE: 98 F

## 2024-05-09 VITALS
OXYGEN SATURATION: 100 % | SYSTOLIC BLOOD PRESSURE: 142 MMHG | DIASTOLIC BLOOD PRESSURE: 94 MMHG | TEMPERATURE: 98 F | WEIGHT: 254.63 LBS | RESPIRATION RATE: 16 BRPM | HEART RATE: 57 BPM | HEIGHT: 73 IN

## 2024-05-09 DIAGNOSIS — K31.89 OTHER DISEASES OF STOMACH AND DUODENUM: ICD-10-CM

## 2024-05-09 DIAGNOSIS — Z98.890 OTHER SPECIFIED POSTPROCEDURAL STATES: Chronic | ICD-10-CM

## 2024-05-09 PROCEDURE — 43259 EGD US EXAM DUODENUM/JEJUNUM: CPT

## 2024-05-09 RX ORDER — METOPROLOL TARTRATE 50 MG
1 TABLET ORAL
Refills: 0 | DISCHARGE

## 2024-05-09 RX ORDER — AMLODIPINE BESYLATE 2.5 MG/1
1 TABLET ORAL
Refills: 0 | DISCHARGE

## 2024-05-09 RX ORDER — FENTANYL CITRATE 50 UG/ML
50 INJECTION INTRAVENOUS
Refills: 0 | Status: DISCONTINUED | OUTPATIENT
Start: 2024-05-09 | End: 2024-05-09

## 2024-05-09 RX ORDER — SODIUM CHLORIDE 9 MG/ML
1000 INJECTION, SOLUTION INTRAVENOUS
Refills: 0 | Status: DISCONTINUED | OUTPATIENT
Start: 2024-05-09 | End: 2024-05-09

## 2024-05-09 RX ORDER — METFORMIN HYDROCHLORIDE 850 MG/1
1 TABLET ORAL
Refills: 0 | DISCHARGE

## 2024-05-09 RX ORDER — ROSUVASTATIN CALCIUM 5 MG/1
1 TABLET ORAL
Refills: 0 | DISCHARGE

## 2024-05-09 RX ORDER — ONDANSETRON 8 MG/1
4 TABLET, FILM COATED ORAL ONCE
Refills: 0 | Status: DISCONTINUED | OUTPATIENT
Start: 2024-05-09 | End: 2024-05-09

## 2024-05-09 NOTE — ASU PATIENT PROFILE, ADULT - FALL HARM RISK - UNIVERSAL INTERVENTIONS
Bed in lowest position, wheels locked, appropriate side rails in place/Call bell, personal items and telephone in reach/Instruct patient to call for assistance before getting out of bed or chair/Non-slip footwear when patient is out of bed/Starlight to call system/Physically safe environment - no spills, clutter or unnecessary equipment/Purposeful Proactive Rounding/Room/bathroom lighting operational, light cord in reach

## 2024-05-14 DIAGNOSIS — F17.210 NICOTINE DEPENDENCE, CIGARETTES, UNCOMPLICATED: ICD-10-CM

## 2024-05-14 DIAGNOSIS — K31.89 OTHER DISEASES OF STOMACH AND DUODENUM: ICD-10-CM

## 2024-05-14 DIAGNOSIS — Z79.84 LONG TERM (CURRENT) USE OF ORAL HYPOGLYCEMIC DRUGS: ICD-10-CM

## 2024-05-14 DIAGNOSIS — I10 ESSENTIAL (PRIMARY) HYPERTENSION: ICD-10-CM

## 2024-05-14 DIAGNOSIS — E78.5 HYPERLIPIDEMIA, UNSPECIFIED: ICD-10-CM

## 2024-05-14 DIAGNOSIS — E11.9 TYPE 2 DIABETES MELLITUS WITHOUT COMPLICATIONS: ICD-10-CM

## 2024-09-23 NOTE — ASU PATIENT PROFILE, ADULT - MEDICATION HERBAL REMEDIES, PROFILE
Quality 226: Preventive Care And Screening: Tobacco Use: Screening And Cessation Intervention: Patient screened for tobacco use and is an ex/non-smoker Detail Level: Zone Quality 130: Documentation Of Current Medications In The Medical Record: Current Medications Documented Quality 431: Preventive Care And Screening: Unhealthy Alcohol Use - Screening: Patient not identified as an unhealthy alcohol user when screened for unhealthy alcohol use using a systematic screening method no
